# Patient Record
Sex: FEMALE | URBAN - METROPOLITAN AREA
[De-identification: names, ages, dates, MRNs, and addresses within clinical notes are randomized per-mention and may not be internally consistent; named-entity substitution may affect disease eponyms.]

---

## 2024-03-12 ENCOUNTER — HOME HEALTH ADMISSION (OUTPATIENT)
Dept: HOME HEALTH SERVICES | Facility: HOME HEALTH | Age: 89
End: 2024-03-12

## 2024-03-13 ENCOUNTER — HOME CARE VISIT (OUTPATIENT)
Dept: SCHEDULING | Facility: HOME HEALTH | Age: 89
End: 2024-03-13

## 2024-03-13 VITALS
HEART RATE: 72 BPM | RESPIRATION RATE: 20 BRPM | OXYGEN SATURATION: 96 % | SYSTOLIC BLOOD PRESSURE: 140 MMHG | TEMPERATURE: 97.5 F | DIASTOLIC BLOOD PRESSURE: 60 MMHG

## 2024-03-13 PROCEDURE — G0299 HHS/HOSPICE OF RN EA 15 MIN: HCPCS

## 2024-03-13 PROCEDURE — 0221000100 HH NO PAY CLAIM PROCEDURE

## 2024-03-13 ASSESSMENT — ENCOUNTER SYMPTOMS: DYSPNEA ACTIVITY LEVEL: AFTER AMBULATING LESS THAN 10 FT

## 2024-03-13 NOTE — HOME HEALTH
Reason for the visit: Patient discharged from rehab on 3/12/24.   History of present illness: Patient had recent fall resulting in left hip fracture. Dr Moser performed Left hip ORIF on 1/12/24.  Chief complaint and PMH: Patient has PMH of fall, afib, pacemaker, htn, chf, DMII, hypothyroidism, anxiety, left hip fx  General description of the patient: Patient is AOx4, severely hard of hearing, patient states she does not wear hearing aids. Vitals stable, condition stable. Patient has superficial abrasions from scratching upper and lower exremities, trunk, back, buttocks. Patient has erythema to low back, buttocks and bilateral lower extremities. There is a wound to left buttock. Lower extremities are weeping. At SOC, patient had gauze wrap applied to bilateral lower extremities; wraps were saturated. SN removed and reapplied clean wraps. Patients BLE and feet are red, dry, skin is peeling, and weeping. Patient was being treated for this per hospital notes but the treatment is unclear. When asked about patient's severe itching, she states her skin is dry. Patient denies having any contact with vermin.  Assessment, plan and focus of care: Cardiovascular disease process and management, wound care, infection control, home safety, education, med management  Caregiver involvement: Daughter and son are available via phone but do not live nearby.   Medication reconciliation: Medications reviewed with patient. Patient unsure of a finalized medication list. Patient states medications were changed in rehab but she has no new scripts or discharge paperwork in the home. Patient instructed to follow up with PCP.  Reason for homebound status: Imapired functional mobility, risk for fall, patient requires use of assistive device and patient requires assistance of another person to leave home safely.  Patient education provided this visit: Cardiovascular disease process and management, med management, infection control, home safety,

## 2024-03-14 ENCOUNTER — HOME CARE VISIT (OUTPATIENT)
Dept: HOME HEALTH SERVICES | Facility: HOME HEALTH | Age: 89
End: 2024-03-14